# Patient Record
Sex: MALE | Race: WHITE | HISPANIC OR LATINO | Employment: FULL TIME | URBAN - METROPOLITAN AREA
[De-identification: names, ages, dates, MRNs, and addresses within clinical notes are randomized per-mention and may not be internally consistent; named-entity substitution may affect disease eponyms.]

---

## 2022-10-25 ENCOUNTER — OFFICE VISIT (OUTPATIENT)
Dept: FAMILY MEDICINE CLINIC | Facility: CLINIC | Age: 67
End: 2022-10-25

## 2022-10-25 VITALS
HEART RATE: 70 BPM | BODY MASS INDEX: 27 KG/M2 | HEIGHT: 67 IN | SYSTOLIC BLOOD PRESSURE: 132 MMHG | TEMPERATURE: 97.9 F | DIASTOLIC BLOOD PRESSURE: 88 MMHG | OXYGEN SATURATION: 94 % | RESPIRATION RATE: 16 BRPM | WEIGHT: 172 LBS

## 2022-10-25 DIAGNOSIS — L05.91 CYST NEAR COCCYX: Primary | ICD-10-CM

## 2022-10-25 DIAGNOSIS — E78.2 MIXED HYPERLIPIDEMIA: ICD-10-CM

## 2022-10-25 DIAGNOSIS — R03.0 ELEVATED BLOOD PRESSURE READING: ICD-10-CM

## 2022-10-25 PROCEDURE — 99204 OFFICE O/P NEW MOD 45 MIN: CPT | Performed by: NURSE PRACTITIONER

## 2022-10-25 RX ORDER — AMOXICILLIN AND CLAVULANATE POTASSIUM 875; 125 MG/1; MG/1
1 TABLET, FILM COATED ORAL EVERY 12 HOURS SCHEDULED
Qty: 20 TABLET | Refills: 0 | Status: SHIPPED | OUTPATIENT
Start: 2022-10-25

## 2022-10-25 NOTE — PROGRESS NOTES
"Chief Complaint  Establish Care and Cyst (Lower back)    Subjective          Jason presents to [facility]  As a 67 year old male to establish care and discuss a small bump on his coccyx area.  States about 20 years ago he had to have a pilonidal cyst surgically drained.  He has not had any problems since that time until just about a week ago he noticed a small bump in the same area.  He has applied warm compresses and does feel like it has drained slightly.  It is slightly red and irritated.  He denies any fever has not taken any medication for this    He denies any significant medical history  States his blood pressure usually runs good when he checks it at home or out of the store  Has never been on any medication  He denies any headaches no blurred vision no dizziness no flushing no chest pain or pressure    States they were watching his blood pressure in the past  He has not had any recent labs    He has no other complaints of today    The following portions of the patient's history were reviewed and updated as appropriate: allergies, current medications, past family history, past medical history, past social history, past surgical history, and problem list    Review of Systems   Constitutional: Negative for chills, fatigue and fever.   Eyes: Negative for visual disturbance.   Respiratory: Negative for cough, shortness of breath and wheezing.    Cardiovascular: Negative for chest pain, palpitations and leg swelling.   Gastrointestinal: Negative for abdominal pain, diarrhea, nausea and vomiting.   Skin: Positive for wound.   Neurological: Negative for dizziness and light-headedness.        Objective   Vital Signs:   Vitals:    10/25/22 0913 10/25/22 0942   BP: 144/99 132/88   Pulse: 70    Resp: 16    Temp: 97.9 °F (36.6 °C)    TempSrc: Skin    SpO2: 94%    Weight: 78 kg (172 lb)    Height: 170.2 cm (67\")         BMI is >= 25 and <30. (Overweight) The following options were offered " after discussion;: weight loss educational material (shared in after visit summary)        Physical Exam  Vitals reviewed.   Constitutional:       General: He is not in acute distress.  Eyes:      Conjunctiva/sclera: Conjunctivae normal.   Neck:      Thyroid: No thyromegaly.      Vascular: No carotid bruit.   Cardiovascular:      Rate and Rhythm: Normal rate and regular rhythm.      Heart sounds: Normal heart sounds.   Pulmonary:      Effort: Pulmonary effort is normal.      Breath sounds: Normal breath sounds.   Skin:     Findings: Abscess and wound present.             Comments: Small erythema abscess to coccyx area-  Small amount of clear drainage   Neurological:      Mental Status: He is alert.   Psychiatric:         Attention and Perception: Attention normal.         Mood and Affect: Mood normal.          Result Review :     The following data was reviewed by: TESSA Winters on 10/25/2022:           Assessment and Plan    Diagnoses and all orders for this visit:    1. Cyst near coccyx (Primary)  Comments:  Follow-up with any worsening symptoms or no improvements  Continue warm compresses  Orders:  -     amoxicillin-clavulanate (Augmentin) 875-125 MG per tablet; Take 1 tablet by mouth Every 12 (Twelve) Hours. One PO BID for infection with food  Dispense: 20 tablet; Refill: 0  -     mupirocin (BACTROBAN) 2 % ointment; Apply 1 application topically to the appropriate area as directed 3 (Three) Times a Day for 7 days.  Dispense: 30 g; Refill: 0  -     Comprehensive Metabolic Panel  -     CBC & Differential  -     Lipid Panel  -     T4, Free  -     TSH    2. Elevated blood pressure reading  Comments:  Monitor blood pressure at home bring log to next visit  Orders:  -     Comprehensive Metabolic Panel  -     CBC & Differential  -     Lipid Panel  -     T4, Free  -     TSH    3. Mixed hyperlipidemia  Comments:  Recheck fasting labs will return  Orders:  -     Comprehensive Metabolic Panel  -     CBC &  Differential  -     Lipid Panel  -     T4, Free  -     TSH        Follow Up   Return in about 2 weeks (around 11/8/2022), or if symptoms worsen or fail to improve, for Return following antibiotics if no improvements or any worsening symptoms wound check 2 weeks.  Patient was given instructions and counseling regarding his condition or for health maintenance advice. Please see specific information pulled into the AVS if appropriate.